# Patient Record
Sex: MALE | Race: WHITE | NOT HISPANIC OR LATINO | Employment: FULL TIME | URBAN - METROPOLITAN AREA
[De-identification: names, ages, dates, MRNs, and addresses within clinical notes are randomized per-mention and may not be internally consistent; named-entity substitution may affect disease eponyms.]

---

## 2017-01-12 RX ORDER — CLONAZEPAM 1 MG/1
2 TABLET ORAL NIGHTLY
Qty: 60 TABLET | Refills: 0 | Status: SHIPPED | OUTPATIENT
Start: 2017-01-12 | End: 2017-01-16 | Stop reason: SDUPTHER

## 2017-01-12 NOTE — TELEPHONE ENCOUNTER
----- Message from Ava Casey sent at 1/12/2017  9:05 AM CST -----  Contact: self [phone]  Patient requesting a refill on klonopin, haven't been able to sleep for about 3 days.    Patient will be using [facility] in [facility] [address].    Please call patient at [phone]. Thanks!

## 2017-01-13 NOTE — TELEPHONE ENCOUNTER
Spoke to pt. He stated has been out of his klonopin for 5 days and couldn't get in because he was out of town.  I advised pt that it is protocol to follow up every 3 months for this medication as previously discussed with him and he hasnt been seen since July 2016. Pt verbalized understanding. Appt scheduled.

## 2017-01-16 ENCOUNTER — OFFICE VISIT (OUTPATIENT)
Dept: FAMILY MEDICINE | Facility: CLINIC | Age: 52
End: 2017-01-16

## 2017-01-16 VITALS
TEMPERATURE: 99 F | DIASTOLIC BLOOD PRESSURE: 88 MMHG | BODY MASS INDEX: 37.78 KG/M2 | HEIGHT: 69 IN | SYSTOLIC BLOOD PRESSURE: 138 MMHG | WEIGHT: 255.06 LBS | HEART RATE: 70 BPM

## 2017-01-16 DIAGNOSIS — G47.00 INSOMNIA, UNSPECIFIED TYPE: ICD-10-CM

## 2017-01-16 DIAGNOSIS — F98.8 ADD (ATTENTION DEFICIT DISORDER): Primary | ICD-10-CM

## 2017-01-16 DIAGNOSIS — I10 ESSENTIAL HYPERTENSION: ICD-10-CM

## 2017-01-16 DIAGNOSIS — E88.810 METABOLIC SYNDROME: ICD-10-CM

## 2017-01-16 PROCEDURE — 99213 OFFICE O/P EST LOW 20 MIN: CPT | Mod: PBBFAC,PO | Performed by: FAMILY MEDICINE

## 2017-01-16 PROCEDURE — 99212 OFFICE O/P EST SF 10 MIN: CPT | Mod: S$PBB,,, | Performed by: FAMILY MEDICINE

## 2017-01-16 PROCEDURE — 99999 PR PBB SHADOW E&M-EST. PATIENT-LVL III: CPT | Mod: PBBFAC,,, | Performed by: FAMILY MEDICINE

## 2017-01-16 RX ORDER — DEXTROAMPHETAMINE SACCHARATE, AMPHETAMINE ASPARTATE, DEXTROAMPHETAMINE SULFATE AND AMPHETAMINE SULFATE 5; 5; 5; 5 MG/1; MG/1; MG/1; MG/1
1 TABLET ORAL DAILY
Qty: 30 TABLET | Refills: 0 | Status: SHIPPED | OUTPATIENT
Start: 2017-02-16 | End: 2017-02-08 | Stop reason: SDUPTHER

## 2017-01-16 RX ORDER — ATORVASTATIN CALCIUM 10 MG/1
10 TABLET, FILM COATED ORAL DAILY
Qty: 90 TABLET | Refills: 3 | Status: ON HOLD | OUTPATIENT
Start: 2017-01-16 | End: 2017-05-02 | Stop reason: HOSPADM

## 2017-01-16 RX ORDER — CLONAZEPAM 1 MG/1
2 TABLET ORAL NIGHTLY
Qty: 60 TABLET | Refills: 5 | Status: SHIPPED | OUTPATIENT
Start: 2017-03-16 | End: 2017-02-08 | Stop reason: SDUPTHER

## 2017-01-16 RX ORDER — CLONAZEPAM 1 MG/1
2 TABLET ORAL NIGHTLY
Qty: 60 TABLET | Refills: 5 | Status: SHIPPED | OUTPATIENT
Start: 2017-01-16 | End: 2017-01-16 | Stop reason: SDUPTHER

## 2017-01-16 RX ORDER — AMLODIPINE AND BENAZEPRIL HYDROCHLORIDE 10; 40 MG/1; MG/1
1 CAPSULE ORAL DAILY
Qty: 90 CAPSULE | Refills: 3 | Status: ON HOLD | OUTPATIENT
Start: 2017-01-16 | End: 2017-05-02 | Stop reason: HOSPADM

## 2017-01-16 RX ORDER — DEXTROAMPHETAMINE SACCHARATE, AMPHETAMINE ASPARTATE, DEXTROAMPHETAMINE SULFATE AND AMPHETAMINE SULFATE 5; 5; 5; 5 MG/1; MG/1; MG/1; MG/1
1 TABLET ORAL DAILY
Qty: 30 TABLET | Refills: 0 | Status: SHIPPED | OUTPATIENT
Start: 2017-01-16 | End: 2017-01-16 | Stop reason: SDUPTHER

## 2017-01-16 NOTE — PROGRESS NOTES
"Subjective:       Patient ID: Herminio Watson is a 51 y.o. male.    Chief Complaint: Follow-up (Med f/u. )    HPI Comments: Follow-up ADD.  He has better task accomplishment and focus when he takes Adderall 20 mg in the morning.  His blood pressure has been not well controlled.  Average reading runs 150/90.  No chest pain or dyspnea.  He has insomnia and takes 21 mg Klonopin was at night to sleep.    Review of Systems   Constitutional: Negative for fatigue, fever and unexpected weight change.   Respiratory: Negative for cough and shortness of breath.    Cardiovascular: Negative for chest pain, palpitations and leg swelling.       Objective:     Blood pressure 138/88, pulse 70, temperature 99 °F (37.2 °C), temperature source Oral, height 5' 9" (1.753 m), weight 115.7 kg (255 lb 1.2 oz).      Physical Exam   Constitutional:   He is overweight and in no distress.   Cardiovascular: Normal rate, regular rhythm and normal heart sounds.    No carotid bruits and no heart murmurs.  Good pedal pulses.   Pulmonary/Chest: Effort normal and breath sounds normal. No respiratory distress. He has no wheezes.       Assessment:       1. ADD (attention deficit disorder)    2. Essential hypertension    3. Metabolic syndrome    4. Insomnia, unspecified type        Plan:       I refilled Adderall for 3 months.  I refilled Klonopin.  Change Lotrel to 10/40 mg daily.  Follow-up in 3 months.  Lab work ordered.  He will go to [facility].     "

## 2017-02-08 ENCOUNTER — TELEPHONE (OUTPATIENT)
Dept: FAMILY MEDICINE | Facility: CLINIC | Age: 52
End: 2017-02-08

## 2017-02-08 RX ORDER — DEXTROAMPHETAMINE SACCHARATE, AMPHETAMINE ASPARTATE, DEXTROAMPHETAMINE SULFATE AND AMPHETAMINE SULFATE 5; 5; 5; 5 MG/1; MG/1; MG/1; MG/1
1 TABLET ORAL DAILY
Qty: 30 TABLET | Refills: 0 | Status: ON HOLD | OUTPATIENT
Start: 2017-02-08 | End: 2017-05-02 | Stop reason: HOSPADM

## 2017-02-08 RX ORDER — CLONAZEPAM 1 MG/1
2 TABLET ORAL NIGHTLY
Qty: 60 TABLET | Refills: 5 | Status: ON HOLD | OUTPATIENT
Start: 2017-03-08 | End: 2017-04-25

## 2017-02-08 NOTE — TELEPHONE ENCOUNTER
We cannot approved early refill via verbal order. They will need a new rx dated today. If ok, please send and will call pharmacy to cancel rx dated 2/16/17. Thanks. Abril

## 2017-02-08 NOTE — TELEPHONE ENCOUNTER
Recd call from Flori at [facility] pharmacy. Pt is requesting to have Adderall and klonopin refilled early, he is going out of town for work from 02/09/2017 to 02/19/2017.  He is not due for refills until 02/16/2017.  Last OV 01/16/2017. He has been unable to fill script out of town in the past and is concerned that this will happen again .  Please advise.

## 2017-04-24 PROBLEM — F32.A DEPRESSION: Status: ACTIVE | Noted: 2017-04-24

## 2017-04-26 PROBLEM — N39.0 UTI (URINARY TRACT INFECTION): Status: ACTIVE | Noted: 2017-04-26

## 2017-04-26 PROBLEM — F33.2 MDD (MAJOR DEPRESSIVE DISORDER), RECURRENT SEVERE, WITHOUT PSYCHOSIS: Status: ACTIVE | Noted: 2017-04-26

## 2017-04-26 PROBLEM — F10.10 ALCOHOL ABUSE: Status: ACTIVE | Noted: 2017-04-26

## 2017-04-26 PROBLEM — F41.1 GAD (GENERALIZED ANXIETY DISORDER): Status: ACTIVE | Noted: 2017-04-26

## 2017-04-26 PROBLEM — R74.01 TRANSAMINITIS: Status: ACTIVE | Noted: 2017-04-26

## 2017-05-04 ENCOUNTER — PATIENT OUTREACH (OUTPATIENT)
Dept: ADMINISTRATIVE | Facility: CLINIC | Age: 52
End: 2017-05-04
Payer: MEDICAID

## 2017-05-04 NOTE — PROGRESS NOTES
C3 nurse attempted to contact patient. No answer. The following message was left for the patient to return the call:  Good morning I am a nurse calling on behalf of [facility] from the [facility].  This is a Transitional Care Call for Herminio Watson. When you have a moment please contact us at [phone] or [phone] Monday through Friday, between the hours of 8 am to 4 pm. We look forward to speaking with you. On behalf of [facility] have a nice day.    The patient does not have an [facility] PCP, C3 nurse with continued efforts to contact patient.

## 2017-05-04 NOTE — PATIENT INSTRUCTIONS
Depression Affects Your Mind and Body  Everyone feels sad or blue from time to time for a few days or weeks. Depression is when these feelings don't go away and they interfere with daily life.  Depression is a real illness. It makes you feel sad and helpless. It gets in the way of your life and relationships. It inhibits your ability to think and act. But, with help, you can feel better again.     When I was depressed, I felt awful. I was so tired all the time I could hardly think, but at night I couldnt fall asleep. My head hurt. My stomach hurt. I didnt know what was wrong with me.   Depression affects your whole body  Brain chemicals affect your body as well as your mood. So depression may do more than just make you feel low. You may also feel bad physically. Depression can:  · Cause trouble with mental tasks such as remembering, concentrating, or making decisions  · Make you feel nervous and jumpy  · Cause trouble sleeping. Or you may sleep too much  · Change your appetite  · Cause headaches, stomachaches, or other aches and pains  · Drain your body of energy  Depression and other illness  It is common for people who have chronic health problems to also have depression. It can often be hard to tell which one caused the other. A person might become depressed after finding out they have a health problem. But some studies suggest being depressed may make certain health problems more likely. And some depressed people stop taking care of themselves. This may make them more likely to get sick.  Date Last Reviewed: 1/19/2015  © 2747-0460 [employer]. [address] [place] [address], [place] [address]. All rights reserved. This information is not intended as a substitute for professional medical care. Always follow your healthcare professional's instructions.

## 2018-05-21 DIAGNOSIS — Z12.11 COLON CANCER SCREENING: ICD-10-CM
